# Patient Record
Sex: MALE | Race: BLACK OR AFRICAN AMERICAN | NOT HISPANIC OR LATINO | Employment: FULL TIME | URBAN - METROPOLITAN AREA
[De-identification: names, ages, dates, MRNs, and addresses within clinical notes are randomized per-mention and may not be internally consistent; named-entity substitution may affect disease eponyms.]

---

## 2018-01-14 ENCOUNTER — HOSPITAL ENCOUNTER (EMERGENCY)
Facility: CLINIC | Age: 23
Discharge: HOME | End: 2018-01-14
Attending: EMERGENCY MEDICINE | Admitting: EMERGENCY MEDICINE

## 2018-01-14 ENCOUNTER — APPOINTMENT (OUTPATIENT)
Dept: GENERAL RADIOLOGY | Facility: CLINIC | Age: 23
End: 2018-01-14
Attending: EMERGENCY MEDICINE

## 2018-01-14 VITALS
TEMPERATURE: 98.2 F | RESPIRATION RATE: 16 BRPM | OXYGEN SATURATION: 99 % | WEIGHT: 195 LBS | DIASTOLIC BLOOD PRESSURE: 93 MMHG | SYSTOLIC BLOOD PRESSURE: 147 MMHG

## 2018-01-14 DIAGNOSIS — S93.402A SPRAIN OF LEFT ANKLE, UNSPECIFIED LIGAMENT, INITIAL ENCOUNTER: ICD-10-CM

## 2018-01-14 PROCEDURE — 73610 X-RAY EXAM OF ANKLE: CPT | Mod: LT

## 2018-01-14 PROCEDURE — 73630 X-RAY EXAM OF FOOT: CPT | Mod: LT

## 2018-01-14 PROCEDURE — 99283 EMERGENCY DEPT VISIT LOW MDM: CPT | Mod: Z6 | Performed by: EMERGENCY MEDICINE

## 2018-01-14 PROCEDURE — 99283 EMERGENCY DEPT VISIT LOW MDM: CPT | Performed by: EMERGENCY MEDICINE

## 2018-01-14 ASSESSMENT — ENCOUNTER SYMPTOMS
SHORTNESS OF BREATH: 0
BACK PAIN: 0
NECK PAIN: 0

## 2018-01-14 NOTE — ED AVS SNAPSHOT
[facility], Emergency Department    [address] [address]    Phone:  [phone]                                       Joshua Toussaint   MRN: [id number]    Department:  [facility], Emergency Department   Date of Visit:  1/14/2018           Patient Information     Date Of Birth          1995        Your diagnoses for this visit were:     Sprain of left ankle, unspecified ligament, initial encounter        You were seen by Elizabeth Rojas MD.        Discharge Instructions       Please make an appointment to follow up with Your Primary Care Provider  [facility] Orth[facility] (phone: [phone]) in 7-14 days.  Wear Aircast for comfort.  Rest, ice, and elevate the foot when resting.  Stay off of it for a couple of days.    Discharge References/Attachments     SPRAIN, ANKLE (ADULT) (ENGLISH)      24 Hour Appointment Hotline       To make an appointment at any [facility] clinic, call [phone] ([phone]). If you don't have a family doctor or clinic, we will help you find one. [facility] clinics are conveniently located to serve the needs of you and your family.          ED Discharge Orders     Air Stirrup adult           Crutches       Use gait belt during crutch training.                     Review of your medicines      Our records show that you are taking the medicines listed below. If these are incorrect, please call your family doctor or clinic.        Dose / Directions Last dose taken    ASPIRIN PO        Refills:  0        TYLENOL PO        Refills:  0                Procedures and tests performed during your visit     Ankle XR, G/E 3 views, left    Foot XR, G/E 3 views, left      Orders Needing Specimen Collection     None      Pending Results     Date and Time Order Name Status Description    1/14/2018 0815 Ankle XR, G/E 3 views, left Preliminary     1/14/2018 0815 Foot XR, G/E 3 views, left Preliminary             Pending Culture Results     No orders found from 1/12/2018 to  "1/15/2018.            Pending Results Instructions     If you had any lab results that were not finalized at the time of your Discharge, you can call the ED Lab Result RN at [phone]. You will be contacted by this team for any positive Lab results or changes in treatment. The nurses are available 7 days a week from 10A to 6:30P.  You can leave a message 24 hours per day and they will return your call.        Thank you for choosing [facility]       Thank you for choosing [facility] for your care. Our goal is always to provide you with excellent care. Hearing back from our patients is one way we can continue to improve our services. Please take a few minutes to complete the written survey that you may receive in the mail after you visit with us. Thank you!        [employer] Information     [employer] lets you send messages to your doctor, view your test results, renew your prescriptions, schedule appointments and more. To sign up, go to [email or web address] . Click on \"Log in\" on the left side of the screen, which will take you to the Welcome page. Then click on \"Sign up Now\" on the right side of the page.     You will be asked to enter the access code listed below, as well as some personal information. Please follow the directions to create your username and password.     Your access code is: [email or web address]  Expires: 2018  9:12 AM     Your access code will  in 90 days. If you need help or a new code, please call your [facility] clinic or [phone].        Care EveryWhere ID     This is your Care EveryWhere ID. This could be used by other organizations to access your [facility] medical records  [id number]        Equal Access to Services     [facility] Center[facility] : Hadmarisel Sanchez, marlon [place], shannan levi. So [facility] [phone].    ATENCIÓN: Si habla español, tiene a araiza disposición servicios gratuitos de asistencia lingüística. Llame al " [phone].    We comply with applicable federal civil rights laws and [place] laws. We do not discriminate on the basis of race, color, national origin, age, disability, sex, sexual orientation, or gender identity.            After Visit Summary       This is your record. Keep this with you and show to your community pharmacist(s) and doctor(s) at your next visit.

## 2018-01-14 NOTE — DISCHARGE INSTRUCTIONS
Please make an appointment to follow up with Your Primary Care Provider  [facility] (phone: [phone]) in 7-14 days.  Wear Aircast for comfort.  Rest, ice, and elevate the foot when resting.  Stay off of it for a couple of days.

## 2018-01-14 NOTE — ED PROVIDER NOTES
History     Chief Complaint   Patient presents with     Ankle Pain     HPI  Joshua Toussaint is a 22 year old male who presents to the ED after an ankle injury. Patient states he was playing basketball yesterday when he twisted his left ankle outward. He states it was painful to walk on at first, but kept playing basketball afterwards. He reports elevating his ankle for an hour and using heat packs, but the ankle pain got worse this morning. He otherwise currently denies neck or back pain. He did not fall.    Social: Here with girlfriend and 2.5 week old baby.     PAST MEDICAL HISTORY  History reviewed. No pertinent past medical history.  PAST SURGICAL HISTORY  History reviewed. No pertinent surgical history.  FAMILY HISTORY  No family history on file.  SOCIAL HISTORY  Social History   Substance Use Topics     Smoking status: Current Every Day Smoker     Packs/day: 0.50     Smokeless tobacco: Never Used     Alcohol use No     MEDICATIONS  No current facility-administered medications for this encounter.      Current Outpatient Prescriptions   Medication     Acetaminophen (TYLENOL PO)     ASPIRIN PO     ALLERGIES  Not on File    I have reviewed the Medications, Allergies, Past Medical and Surgical History, and Social History in the Epic system.    Review of Systems   Respiratory: Negative for shortness of breath.    Cardiovascular: Negative for chest pain.   Musculoskeletal: Negative for back pain and neck pain.        Positive for left ankle pain .   All other systems reviewed and are negative.      Physical Exam   BP: (!) 147/93  Heart Rate: 81  Temp: 98.2  F (36.8  C)  Resp: 16  Weight: 88.5 kg (195 lb)  SpO2: 99 %      Physical Exam   Constitutional: He is oriented to person, place, and time. He appears well-developed and well-nourished.   HENT:   Head: Normocephalic.   Cardiovascular: Normal rate.    Pulmonary/Chest: Effort normal.   Musculoskeletal:        Left ankle: He exhibits swelling and ecchymosis. He  exhibits no deformity, no laceration and normal pulse. Tenderness. Lateral malleolus tenderness found. No medial malleolus, no head of 5th metatarsal and no proximal fibula tenderness found. Achilles tendon normal.        Left foot: There is tenderness, bony tenderness and swelling. There is no crepitus, no deformity and no laceration.        Feet:    Neurological: He is alert and oriented to person, place, and time.   Skin: Skin is warm and dry.   Psychiatric: He has a normal mood and affect.   Nursing note and vitals reviewed.    Musculoskeletal: See diagram above.  The patient has tenderness and swelling to the left lateral malleolus and to the head of the fifth metatarsal.  There is mild ecchymoses.  Distal pulses are strong with less than 2 second capillary refill  ED Course     ED Course     Procedures   8:10 AM  The patient was seen and examined by Dr. Rojas in Room 22.                Critical Care time:  none            Results for orders placed or performed during the hospital encounter of 01/14/18 (from the past 24 hour(s))   Ankle XR, G/E 3 views, left    Narrative    No fracture or subluxation   Foot XR, G/E 3 views, left    Narrative     No fracture or subluxation.        Labs Ordered and Resulted from Time of ED Arrival Up to the Time of Departure from the ED - No data to display         Assessments & Plan (with Medical Decision Making)       I have reviewed the nursing notes.  Emergency Department course:  The patient was seen and examined at 0810 am.  X-rays of the left ankle and foot show no fracture or subluxation.  The patient has a left ankle sprain.  He was placed in an air splint and fitted for crutches.  He should rest, ice, and elevate the leg when resting.  I discussed range of motion exercises with him once he was feeling better.  He should follow-up with his regular physician or with orthopedics.  He works in manufacturing and walks around a lot.  I gave him a work note for off work  until Tuesday, January 16, and then modified work with limited weightbearing.  I have reviewed the findings, diagnosis, plan and need for follow up with the patient.    New Prescriptions    No medications on file       Final diagnoses:   Sprain of left ankle, unspecified ligament, initial encounter   I, Javid Neil , am serving as a trained medical scribe to document services personally performed by Elizabeth Rojas MD, based on the provider's statements to me.      IElizabeth MD, was physically present and have reviewed and verified the accuracy of this note documented by Javid Neil.   This note was created in part by the use of Dragon voice recognition dictation system. Inadvertent grammatical errors and typographical errors may still exist.  Elizabeth Rojas MD        1/14/2018   [facility], [place], EMERGENCY DEPARTMENT     Elizabeth Rojas MD  01/14/18 1007

## 2018-01-14 NOTE — LETTER
January 14, 2018      To Whom It May Concern:      Joshua Toussaint was seen in our Emergency Department today, 01/14/18.  Please excuse him from work until January 16.  He should then be on modified work with limited weightbearing on his left ankle for the next 4-5 days.     Sincerely,        Elizabeth Rojas MD

## 2018-01-14 NOTE — ED NOTES
Presents after ankle injury that occurred yesterday. Pt rolled ankle. Today has increased pain and swelling. CMS intact.

## 2018-01-14 NOTE — ED AVS SNAPSHOT
[facility], [place], Emergency Department    [address] [place] [id number]    Phone:  [phone]                                       Joshua Toussaint   MRN: [id number]    Department:  [facility], Emergency Department   Date of Visit:  1/14/2018           After Visit Summary Signature Page     I have received my discharge instructions, and my questions have been answered. I have discussed any challenges I see with this plan with the nurse or doctor.    ..........................................................................................................................................  Patient/Patient Representative Signature      ..........................................................................................................................................  Patient Representative Print Name and Relationship to Patient    ..................................................               ................................................  Date                                            Time    ..........................................................................................................................................  Reviewed by Signature/Title    ...................................................              ..............................................  Date                                                            Time